# Patient Record
Sex: FEMALE | Race: WHITE | NOT HISPANIC OR LATINO | URBAN - METROPOLITAN AREA
[De-identification: names, ages, dates, MRNs, and addresses within clinical notes are randomized per-mention and may not be internally consistent; named-entity substitution may affect disease eponyms.]

---

## 2017-01-30 ENCOUNTER — OFFICE VISIT - RIVER FALLS (OUTPATIENT)
Dept: FAMILY MEDICINE | Facility: CLINIC | Age: 42
End: 2017-01-30

## 2017-01-30 ASSESSMENT — MIFFLIN-ST. JEOR: SCORE: 1329.67

## 2017-02-07 ENCOUNTER — OFFICE VISIT - RIVER FALLS (OUTPATIENT)
Dept: FAMILY MEDICINE | Facility: CLINIC | Age: 42
End: 2017-02-07

## 2017-02-07 ASSESSMENT — MIFFLIN-ST. JEOR: SCORE: 1323.32

## 2017-02-16 ENCOUNTER — OFFICE VISIT - RIVER FALLS (OUTPATIENT)
Dept: FAMILY MEDICINE | Facility: CLINIC | Age: 42
End: 2017-02-16

## 2017-02-16 ASSESSMENT — MIFFLIN-ST. JEOR: SCORE: 1332.39

## 2017-07-11 ENCOUNTER — AMBULATORY - RIVER FALLS (OUTPATIENT)
Dept: FAMILY MEDICINE | Facility: CLINIC | Age: 42
End: 2017-07-11

## 2017-11-17 ENCOUNTER — OFFICE VISIT - RIVER FALLS (OUTPATIENT)
Dept: FAMILY MEDICINE | Facility: CLINIC | Age: 42
End: 2017-11-17

## 2017-11-17 ASSESSMENT — MIFFLIN-ST. JEOR: SCORE: 1325.14

## 2022-02-12 VITALS
HEIGHT: 64 IN | TEMPERATURE: 97.4 F | HEART RATE: 56 BPM | RESPIRATION RATE: 16 BRPM | BODY MASS INDEX: 26.4 KG/M2 | WEIGHT: 156.6 LBS | TEMPERATURE: 97.9 F | HEIGHT: 64 IN | OXYGEN SATURATION: 98 % | SYSTOLIC BLOOD PRESSURE: 116 MMHG | DIASTOLIC BLOOD PRESSURE: 68 MMHG | BODY MASS INDEX: 26.63 KG/M2 | WEIGHT: 154.6 LBS | DIASTOLIC BLOOD PRESSURE: 76 MMHG | HEIGHT: 64 IN | WEIGHT: 156 LBS | HEART RATE: 60 BPM | TEMPERATURE: 99.1 F | SYSTOLIC BLOOD PRESSURE: 130 MMHG | BODY MASS INDEX: 26.73 KG/M2

## 2022-02-12 VITALS
DIASTOLIC BLOOD PRESSURE: 68 MMHG | TEMPERATURE: 98.2 F | HEIGHT: 64 IN | SYSTOLIC BLOOD PRESSURE: 100 MMHG | BODY MASS INDEX: 26.46 KG/M2 | HEART RATE: 64 BPM | WEIGHT: 155 LBS

## 2022-02-16 NOTE — PROGRESS NOTES
Patient:   OBDULIO CARTY            MRN: [id number]            FIN: [id number]               Age:   42 years     Sex:  Female     :  1975   Associated Diagnoses:   Routine screening for STI (sexually transmitted infection); Counseling for travel   Author:   Flor Daniels      Visit Information      Date of Service: 2017 10:20 am  Performing Location: [facility]  Encounter#: [id number]      Primary Care Provider (PCP):  97 -UNKNOWN,      Referring Provider:  Axel Azul MD# [id number]      Chief Complaint   2017 10:28 AM CST  Travel care [place]        History of Present Illness             The patient presents with need for travel counseling and immunizations.     SHe is going to [place], she did this last year, she is traveling to an area WITHOUT malaria and does not need prophylaxis  She and her group are bringing all their food and water so does not feel she needs typhoid vaccine, she didn't get it last year and no one in the group is getting it.  She would like cipro for diarrhea in case she gets travelers diarrhea  She is otherwise utd with vaccines    She is with her daughter today and when I ask Obdulio if she has other questions she shows e her phone where she has written me a message requesting STI testing as she and  are having marital problems. She does not want me to discuss in front of her daughter. She does deny 'symptoms'. Also confirms with me her phone contact and she is on the portal so she will make sure she can access that for test results/communications      Review of Systems   Constitutional:  Negative.    Eye:  Negative.    Ear/Nose/Mouth/Throat:  Negative.    Respiratory:  Negative.    Cardiovascular:  Negative.    Gastrointestinal:  Negative.    Genitourinary:  Negative.    Gynecologic:  Negative.    Hematology/Lymphatics:  Negative.    Endocrine:  Negative.    Immunologic:  Negative.    Musculoskeletal:  Negative.    Integumentary:   Negative.    Neurologic:  Negative.    Psychiatric:  Negative.          All other systems reviewed and negative      Health Status   Allergies:    Allergic Reactions (Selected)  No known allergies   Medications:  (Selected)   Prescriptions  Prescribed  Cipro 250 mg oral tablet: 1 tab(s) ( 250 mg ), PO, q12hr (int), Instructions: if needed for travelers diarrhea, # 6 tab(s), 0 Refill(s), Type: Maintenance, Pharmacy: [facility], 1 tab(s) po q12 hrs,x3 day(s),Instr:if needed for travelers diarrhea  Imitrex 50 mg oral tablet: 1 tab(s) ( 50 mg ), po, once, Instructions: may repeat dose once in 2 hours, # 9 tab(s), 0 Refill(s), Type: Soft Stop, Pharmacy: [facility], 1 tab(s) po once,Instr:may repeat dose once in 2 hours  Documented Medications  Documented  Mirena 52 mg intrauteral device: 1 EA ( 52 mg ), intrauteral, once, 0 Refill(s), Type: Maintenance   Problem list:    All Problems  Adult Macrosomia / ICD-9-.0 / Confirmed  Adult Macrosomia / ICD-9-.0 / Confirmed  Migraine / ICD-9-.90 / Confirmed  Chronic Constipation / ICD-9-.00 / Confirmed  Hypersomnia, idiopathic / ICD-9-.54 / Confirmed  Inactive: Gestational diabetes / ICD-9-.80  Resolved: Pregnancy / SNOMED CT 896921709  Resolved: Pregnancy / SNOMED CT 572766880  Resolved: Pregnancy / SNOMED CT 587293722  Resolved: Pregnancy / SNOMED CT 530294877  Canceled: Macrosomia      Histories   Past Medical History:    Active  Hypersomnia, idiopathic (780.54): Onset on 11/20/2011 at 36 years.  Chronic Constipation (564.00)  Migraine (346.90)  Adult Macrosomia (253.0)  Adult Macrosomia (253.0)  Resolved  Pregnancy (505943313):  Resolved on 9/12/2009 at 34 years.  Pregnancy (401764269):  Resolved on 1/11/2004 at 28 years.  Pregnancy (144247468):  Resolved on 12/18/2005 at 30 years.  Pregnancy (229614015):  Resolved on 10/24/2008 at 33 years.   Family History:    Cancer  Mother  Comments:  11/8/2010 1:00 PM - Chantelle Mays  of  "the throat.  Hypertension  Father  Hypothyroidism  Mother  Alcohol abuse  Father     Procedure history:    Laparoscopy with lysis of adhesions (SNOMED CT 21266839) performed by Sukh Santana DO on 2014 at 39 Years.  Insertion of IUD (SNOMED CT 148310543) performed by Sukh Santana DO on 2014 at 39 Years.  Comments:  2014 11:02 AM - Felicita Dowell  MSLT - Multiple sleep latency test (SNOMED CT 392761361) on 2011 at 36 Years.  Polysomnogram (SNOMED CT 380188985) on 2011 at 36 Years.  Colonoscopy (SNOMED CT 372446408) on 2011 at 35 Years.  Comments:  2011 10:47 AM - Alonso Hendrickson MA  Repeat colonoscopy at the age of 51   section (SNOMED CT 64050884) on 2009 at 34 Years.  D&C - Dilatation and curettage (SNOMED CT 2091379746) on 10/24/2008 at 33 Years.  Comments:  2013 9:42 AM - Saskia Wall  Missed AB  Cystourethrogram (SNOMED CT 780622377) on 2002 at 27 Years.  Comments:  2013 9:53 AM - Saskia Wall  Voiding  LEEP (SNOMED CT 54840641) in  at 23 Years.  Colposcopy (SNOMED CT 4772676675) in  at 23 Years.  Comments:  2013 9:56 AM - Saskia Wall  Normal  Cholecystectomy (SNOMED CT 74896306).  Comments:  2013 9:57 AM - Saskia Wall  Laparoscopic  Laparoscopy (SNOMED CT 641833415).  Extraction of wisdom tooth (SNOMED CT 060284751).   Social History:        Alcohol Assessment: Current            Current                     Comments:                      2017 - Magui King                     \"Not much\"      Tobacco Assessment: Denies Tobacco Use            Never smoker      Substance Abuse Assessment: Denies Substance Abuse            Never      Employment and Education Assessment            Employed, Work/School description: , [employer].      Home and Environment Assessment            Marital status: .  Spouse/Partner name: Ron Chaidez.  3 children.      Nutrition and Health Assessment       "      Type of diet: Regular.      Exercise and Physical Activity Assessment: Regular exercise            Exercise frequency: 6x/week Cardio and weights.      Sexual Assessment            Sexually active: Yes.  Contraceptive Use Details: Intrauterine device.      Other Assessment            Marital status                     Comments:                      11/08/2010 - Chantelle Mays                             Physical Examination   Vital Signs   11/17/2017 10:28 AM CST Temperature Tympanic 98.2 DegF    Peripheral Pulse Rate 64 bpm    Pulse Site Radial artery    HR Method Manual    Systolic Blood Pressure 100 mmHg    Diastolic Blood Pressure 68 mmHg    Mean Arterial Pressure 79 mmHg    BP Site Right arm    BP Method Manual      Measurements from flowsheet : Measurements   11/17/2017 10:28 AM CST Height Measured - Standard 63.5 in    Weight Measured - Standard 155.0 lb    BSA 1.77 m2    Body Mass Index 27.02 kg/m2      General:  Alert and oriented, No acute distress.    Integumentary:  Warm, Dry, Pink.    Psychiatric:  Cooperative, Appropriate mood & affect.       Impression and Plan   Diagnosis     Routine screening for STI (sexually transmitted infection) (HUO10-UP Z11.3).     Counseling for travel (JAO25-KV Z71.89).     Patient Instructions:       Counseled: Patient, Regarding diagnosis, Regarding treatment, Regarding medications, Verbalized understanding.    Orders     Orders (Selected)   Outpatient Orders  Ordered (Dispatched)  Chlamydia/Neisseria gonorrhoeae RNA, TMA* ([facility]): Specimen Type: Urine, Collection Date: 11/17/17 10:59:00 CST  HIV-1/2 Antigen and Antibodies, Fourth Generation, with Reflexes* ([facility]): Specimen Type: Serum, Collection Date: 11/17/17 10:59:00 CST  Hepatitis C Antibody* ([facility]): Specimen Type: Serum, Collection Date: 11/17/17 10:59:00 CST  RPR (dx) w/refl titer and confirmatory testing* ([facility]): Specimen Type: Serum, Collection Date: 11/17/17 10:59:00  CST  Prescriptions  Prescribed  Cipro 250 mg oral tablet: 1 tab(s) ( 250 mg ), PO, q12hr (int), Instructions: if needed for travelers diarrhea, # 6 tab(s), 0 Refill(s), Type: Maintenance, Pharmacy: [facility], 1 tab(s) po q12 hrs,x3 day(s),Instr:if needed for travelers diarrhea.     Check CDC.gov website for last minute travel notices and good advice regarding travel.  Spent 15 min in counseling regarding vaccinations, prophylaxisis with any needed mediacation,  prevention of diseases , use of antidiarrheall  medicines if warranted and travel safety.  -declines typhoid and flu vaccine  --malaria prophylaxis not needed as will be in city without Malaria  --She assures me she is NOT pregnancy and will not become pregnant, Zika is certainly in [place].

## 2022-02-16 NOTE — PROGRESS NOTES
"   Patient:   OBDULIO CARTY            MRN: [id number]            FIN: [id number]               Age:   41 years     Sex:  Female     :  1975   Associated Diagnoses:   Numbness   Author:   Parker Mann MD      Visit Information      Date of Service: 2017 09:19 am  Performing Location: [facility]  Encounter#: [id number]      Primary Care Provider (PCP):  RF97 -UNKNOWN,      Referring Provider:  No referring provider recorded for selected visit.      Chief Complaint   2017 9:49 AM CST    Pt here for numbness in elft side of face that she states she has had for \"2 years\" but has become worse x 2 days. Pt also c/o headaches x 2-3 days. Pt also having edema in both hands/fingers that started this morning      History of Present Illness   Has had possible trigeminal neuralgia over the past couple. Feels a split down midline of her face. Feels numb on the left side of her face increased in past couple days. Numbness mainly on the left but intermittently on the left. Gets pain intermittent on both sides of the face. Sometimes gets headaches frontal. Feels TMJ pain over the past couple of years. Has noticed over the years that left corner of mouth drooping more.      Review of Systems   Constitutional:  No fever.    Eye:  No visual disturbances.    Ear/Nose/Mouth/Throat:  No decreased hearing.    Respiratory:  No cough.    Gastrointestinal:  No vomiting.    Musculoskeletal:  No muscle weakness.    Left hand with some numbness yesterday      Health Status   Allergies:    Allergic Reactions (Selected)  No known allergies   Medications:  (Selected)   Prescriptions  Prescribed  Imitrex 50 mg oral tablet: 1 tab(s) ( 50 mg ), po, once, Instructions: may repeat dose once in 2 hours, # 9 tab(s), 0 Refill(s), Type: Soft Stop, Pharmacy: [facility], 1 tab(s) po once,Instr:may repeat dose once in 2 hours  Documented Medications  Documented  Mirena 52 mg intrauteral device: 1 EA ( 52 mg ), " intrauteral, once, 0 Refill(s), Type: Maintenance   Problem list:    All Problems  Adult Macrosomia / ICD-9-.0 / Confirmed  Adult Macrosomia / ICD-9-.0 / Confirmed  Chronic Constipation / ICD-9-.00 / Confirmed  Hypersomnia, idiopathic / ICD-9-.54 / Confirmed  Migraine / ICD-9-.90 / Confirmed  Inactive: Gestational diabetes / ICD-9-.80  Resolved: Pregnancy / SNOMED CT 158688178  Resolved: Pregnancy / SNOMED CT 987784512  Resolved: Pregnancy / SNOMED CT 286562027  Resolved: Pregnancy / SNOMED CT 807740869  Canceled: Macrosomia      Histories   Family History: No aneurysms   Procedure history:    Laparoscopy with lysis of adhesions (SNOMED CT 76642852) performed by Sukh Santana DO on 2014 at 39 Years.  Insertion of IUD (SNOMED CT 567387492) performed by Sukh Santana DO on 2014 at 39 Years.  Comments:  2014 11:02 AM - Felicita Dowell  MSLT - Multiple sleep latency test (SNOMED CT 891922642) on 2011 at 36 Years.  Polysomnogram (SNOMED CT 967200236) on 2011 at 36 Years.  Colonoscopy (SNOMED CT 121041218) on 2011 at 35 Years.  Comments:  2011 10:47 AM - Alonso Hendrickson MA  Repeat colonoscopy at the age of 51   section (SNOMED CT 47007042) on 2009 at 34 Years.  D&C - Dilatation and curettage (SNOMED CT 2224360423) on 10/24/2008 at 33 Years.  Comments:  2013 9:42 AM - Saskia Wall  Missed AB  Cystourethrogram (SNOMED CT 551250337) on 2002 at 27 Years.  Comments:  2013 9:53 AM - Saskia Wall  Voiding  LEEP (SNOMED CT 15076754) in  at 23 Years.  Colposcopy (SNOMED CT 8785013866) in  at 23 Years.  Comments:  2013 9:56 Saskia Tolentino  Normal  Cholecystectomy (SNOMED CT 22483747).  Comments:  2013 9:57 Saskia Tolentino  Laparoscopic  Laparoscopy (SNOMED CT 595218136).  Extraction of wisdom tooth (SNOMED CT 691066942).   Social History:  (Ron). . Nonsmoker      Physical  Examination   Vital Signs   1/30/2017 9:49 AM CST Temperature Tympanic 97.9 DegF    Peripheral Pulse Rate 60 bpm    Systolic Blood Pressure 130 mmHg    Diastolic Blood Pressure 76 mmHg    Oxygen Saturation 98 %      General:  Alert and oriented, No acute distress.    Eye:  Normal conjunctiva.    HENT:  No pharyngeal erythema.    Neck:  No lymphadenopathy.    Respiratory:  Lungs are clear to auscultation.    Cardiovascular:  Normal rate, Regular rhythm.    Gastrointestinal:  Soft, Non-tender, Non-distended.    Musculoskeletal:  Normal gait.    Neurologic:  Alert, Oriented, decreased sensation on the left face.    Psychiatric:  Appropriate mood & affect.       Impression and Plan   Diagnosis     Numbness (GRF59-QL R20.0).     Doubt stroke. Possible TMJ and trigeminal neuralgia. Start naprosyn. MRI/MRA head and neck. Follow up in 1-2 weeks. Warm packs and soft foods    Addendum 2/6: MRI/MRA with no evidence of stroke or aneursym

## 2022-02-16 NOTE — PROGRESS NOTES
Patient:   OBDULIO CARTY            MRN: [id number]            FIN: [id number]               Age:   41 years     Sex:  Female     :  1975   Associated Diagnoses:   Acute sinusitis   Author:   Basilio Wharton MD      Impression and Plan   Diagnosis     Acute sinusitis (LPE57-AM J01.41).     Course:  Worsening.    Orders     Orders   Charges (Evaluation and Management):  02443 office outpatient visit 15 minutes (Charge) (Order): Quantity: 1, Acute sinusitis.     Orders (Selected)   Prescriptions  Prescribed  azithromycin 250 mg oral tablet: 1 tab(s) ( 250 mg ), PO, Daily, # 7 tab(s), 0 Refill(s), Type: Maintenance, Pharmacy: [facility], 1 tab(s) po daily,x7 day(s).        Visit Information      Date of Service: 2017 01:45 pm  Performing Location: [facility]  Encounter#: [id number]      Primary Care Provider (PCP):  VINNY -UNKNOWN,   Visit type:  New symptom.    Accompanied by:  No one.    Source of history:  Self.    History limitation:  None.       Chief Complaint   Chief complaint discussed and confirmed correct.     2017 1:46 PM CST    Pt here for body aches        History of Present Illness             The patient presents with sinus problem.  The sinus problem is located in the frontal sinus, ethmoid sinus and maxillary sinus.  The sinus problem is characterized by nasal congestion, headache and facial pain.  The severity of the sinus problem is severe.  The sinus problem is constant.  The sinus problem has lasted for 4 day(s).  The context of the sinus problem: occurred in association with illness.  Associated symptoms consist of fever, cough and sore throat.        Review of Systems   Constitutional:  Negative.    Eye:  Negative.    Ear/Nose/Mouth/Throat:  Nasal congestion, Sinus pain.    Respiratory:  Negative.    Cardiovascular:  Negative.    Gastrointestinal:  Negative.    Genitourinary:  Negative.    Hematology/Lymphatics:  Negative.    Endocrine:  Negative.     Immunologic:  Negative.    Musculoskeletal:  Negative.    Integumentary:  Negative.    Neurologic:  Negative.    Psychiatric:  Negative.          All other systems reviewed and negative      Health Status   Allergies:    Allergic Reactions (Selected)  No known allergies   Medications:  (Selected)   Prescriptions  Prescribed  Imitrex 50 mg oral tablet: 1 tab(s) ( 50 mg ), po, once, Instructions: may repeat dose once in 2 hours, # 9 tab(s), 0 Refill(s), Type: Soft Stop, Pharmacy: [facility], 1 tab(s) po once,Instr:may repeat dose once in 2 hours  Naprosyn 500 mg oral tablet: 1 tab(s) ( 500 mg ), PO, BID, # 28 tab(s), 0 Refill(s), Type: Maintenance, Pharmacy: [facility], 1 tab(s) po bid,x14 day(s)  azithromycin 250 mg oral tablet: 1 tab(s) ( 250 mg ), PO, Daily, # 7 tab(s), 0 Refill(s), Type: Maintenance, Pharmacy: [facility], 1 tab(s) po daily,x7 day(s)  Documented Medications  Documented  Mirena 52 mg intrauteral device: 1 EA ( 52 mg ), intrauteral, once, 0 Refill(s), Type: Maintenance   Problem list:    All Problems  Adult Macrosomia / ICD-9-.0 / Confirmed  Adult Macrosomia / ICD-9-.0 / Confirmed  Chronic Constipation / ICD-9-.00 / Confirmed  Hypersomnia, idiopathic / ICD-9-.54 / Confirmed  Migraine / ICD-9-.90 / Confirmed  Inactive: Gestational diabetes / ICD-9-.80  Resolved: Pregnancy / SNOMED CT 033305266  Resolved: Pregnancy / SNOMED CT 664279783  Resolved: Pregnancy / SNOMED CT 894919599  Resolved: Pregnancy / SNOMED CT 510057705  Canceled: Macrosomia      Histories   Past Medical History:    Active  Hypersomnia, idiopathic (780.54): Onset on 11/20/2011 at 36 years.  Chronic Constipation (564.00)  Migraine (346.90)  Adult Macrosomia (253.0)  Adult Macrosomia (253.0)  Resolved  Pregnancy (746268759):  Resolved on 9/12/2009 at 34 years.  Pregnancy (400813878):  Resolved on 1/11/2004 at 28 years.  Pregnancy (121640440):  Resolved on 12/18/2005 at 30  "years.  Pregnancy (338674263):  Resolved on 10/24/2008 at 33 years.   Family History:    Cancer  Mother  Comments:  2010 1:00 PM - Chantelle Mays  of the throat.  Hypertension  Father  Hypothyroidism  Mother  Alcohol abuse  Father     Procedure history:    Laparoscopy with lysis of adhesions (SNOMED CT 18028175) performed by Sukh Santana DO on 2014 at 39 Years.  Insertion of IUD (SNOMED CT 373634834) performed by Sukh Santana DO on 2014 at 39 Years.  Comments:  2014 11:02 AM - Felicita Dowell  MSLT - Multiple sleep latency test (SNOMED CT 943080343) on 2011 at 36 Years.  Polysomnogram (SNOMED CT 425887338) on 2011 at 36 Years.  Colonoscopy (SNOMED CT 226496596) on 2011 at 35 Years.  Comments:  2011 10:47 AM - Alonso Hendrickson MA  Repeat colonoscopy at the age of 51   section (SNOMED CT 79804540) on 2009 at 34 Years.  D&C - Dilatation and curettage (SNOMED CT 4246470264) on 10/24/2008 at 33 Years.  Comments:  2013 9:42 AM - Saskia Wall  Missed AB  Cystourethrogram (SNOMED CT 277541624) on 2002 at 27 Years.  Comments:  2013 9:53 AM - Saskia Wall  Voiding  LEEP (SNOMED CT 65620217) in  at 23 Years.  Colposcopy (SNOMED CT 8613635564) in  at 23 Years.  Comments:  2013 9:56 AM - Saskia Wall  Normal  Cholecystectomy (SNOMED CT 32727991).  Comments:  2013 9:57 AM - Saskia Wall  Laparoscopic  Laparoscopy (SNOMED CT 066306465).  Extraction of wisdom tooth (SNOMED CT 918499063).   Social History:        Alcohol Assessment: Current            Current                     Comments:                      2017 - Magui King                     \"Not much\"      Tobacco Assessment: Denies Tobacco Use            Never smoker      Substance Abuse Assessment: Denies Substance Abuse            Never      Employment and Education Assessment            Employed, Work/School description: , [employer].      Home " and Environment Assessment            Marital status: .  Spouse/Partner name: Ron Chaidez.  3 children.      Nutrition and Health Assessment            Type of diet: Regular.      Exercise and Physical Activity Assessment: Regular exercise            Exercise frequency: 6x/week Cardio and weights.      Sexual Assessment            Sexually active: Yes.  Contraceptive Use Details: Intrauterine device.      Other Assessment            Marital status                     Comments:                      11/08/2010 - Chantelle Mays                             Physical Examination   Vital signs reviewed  and within acceptable limits    Vital Signs   2/16/2017 1:46 PM CST    Temperature Tympanic      99.1 DegF     Measurements from flowsheet : Measurements   2/16/2017 1:46 PM CST Height Measured - Standard 63.5 in    Weight Measured - Standard 156.6 lb    BSA 1.78 m2    Body Mass Index 27.3 kg/m2      General:  Alert and oriented, No acute distress.    Eye:  Pupils are equal, round and reactive to light, Extraocular movements are intact, Normal conjunctiva.    HENT:  Normocephalic, Tympanic membranes are clear, Normal hearing, Oral mucosa is moist, No pharyngeal erythema.         Sinus: Bilateral, Ethmoid sinus, Frontal sinus, Maxillary sinus, Tenderness.    Neck:  Supple, Non-tender, No lymphadenopathy.    Respiratory:  Lungs are clear to auscultation, Respirations are non-labored, Breath sounds are equal.    Cardiovascular:  Normal rate, Regular rhythm, No murmur, Normal peripheral perfusion, No edema.    Integumentary:  Warm, Dry, Pink.    Psychiatric:  Cooperative, Appropriate mood & affect, Normal judgment.

## 2022-02-16 NOTE — PROGRESS NOTES
Patient:   OBDULIO CARTY            MRN: [id number]            FIN: [id number]               Age:   41 years     Sex:  Female     :  1975   Associated Diagnoses:   Left facial numbness; Trigeminal neuralgia   Author:   Parker Cortez MD      Visit Information      Date of Service: 2017 12:10 pm  Performing Location: [facility]  Encounter#: [id number]      Primary Care Provider (PCP):  [id number] -UNKNOWN,      Chief Complaint   2017 1:04 PM CST     here to discuss lab and MRI results.  all were done last week.  still having left sided facial numbness as well as left shoulder/back.      History of Present Illness   Patient is here to discuss lab and MRI results from 17 and 17, respectively.  She was seen by Dr. Mann 17 for left sided facial numbness.  She is still having numbness to her face and now has noticed numbness to her left side of her upper back and shoulder.  She does state that she has had numbness for several years and is getting worse.  She was diagnosed with trigeminal neuralgia.  She was taking Carbamazepine in the past but only took it as needed.  She denies daily headaches, no hearing issues.  She is up to date on her eye exams.      Review of Systems   Constitutional:  No fever, No chills.    Eye:  dizziness at times..    Ear/Nose/Mouth/Throat:  Negative.    Gastrointestinal:  No nausea, No vomiting.    Musculoskeletal:  numbness to left shoulder.    Neurologic:  Alert and oriented X4, Numbness, left side of face.             Health Status   Allergies:    Allergic Reactions (Selected)  No known allergies   Medications:  (Selected)   Prescriptions  Prescribed  Imitrex 50 mg oral tablet: 1 tab(s) ( 50 mg ), po, once, Instructions: may repeat dose once in 2 hours, # 9 tab(s), 0 Refill(s), Type: Soft Stop, Pharmacy: [facility], 1 tab(s) po once,Instr:may repeat dose once in 2 hours  Naprosyn 500 mg oral tablet: 1 tab(s) ( 500 mg ), PO, BID, # 28  tab(s), 0 Refill(s), Type: Maintenance, Pharmacy: [facility], 1 tab(s) po bid,x14 day(s)  Documented Medications  Documented  Mirena 52 mg intrauteral device: 1 EA ( 52 mg ), intrauteral, once, 0 Refill(s), Type: Maintenance   Problem list:    All Problems (Selected)  Migraine / ICD-9-.90 / Confirmed  Hypersomnia, idiopathic / ICD-9-.54 / Confirmed  Chronic Constipation / ICD-9-.00 / Confirmed  Adult Macrosomia / ICD-9-.0 / Confirmed  Adult Macrosomia / ICD-9-.0 / Confirmed      Histories   Past Medical History:    Active  Hypersomnia, idiopathic (780.54): Onset on 2011 at 36 years.  Chronic Constipation (564.00)  Migraine (346.90)  Adult Macrosomia (253.0)  Adult Macrosomia (253.0)  Resolved  Pregnancy (927932850):  Resolved on 2009 at 34 years.  Pregnancy (167540788):  Resolved on 2004 at 28 years.  Pregnancy (636460407):  Resolved on 2005 at 30 years.  Pregnancy (876199951):  Resolved on 10/24/2008 at 33 years.   Family History:    Cancer  Mother  Comments:  2010 1:00 PM - Chantelle Mays  of the throat.  Hypertension  Father  Hypothyroidism  Mother  Alcohol abuse  Father     Procedure history:    Laparoscopy with lysis of adhesions (89600145) on 2014 at 39 Years.  Insertion of IUD (344172095) on 2014 at 39 Years.  Comments:  2014 11:02 AM - Felicita Dowell  MSLT - Multiple sleep latency test (236414707) on 2011 at 36 Years.  Polysomnogram (960524628) on 2011 at 36 Years.  Colonoscopy (582651573) on 2011 at 35 Years.  Comments:  2011 10:47 AM - Alonso Hendrickson MA  Repeat colonoscopy at the age of 51   section (81457653) on 2009 at 34 Years.  D&C - Dilatation and curettage (4210156664) on 10/24/2008 at 33 Years.  Comments:  2013 9:42 AM - Saskia Wall  Missed AB  Cystourethrogram (791511567) on 2002 at 27 Years.  Comments:  2013 9:53 AM - Saskia Wall  Voiding  LEEP (80229803) in   at 23 Years.  Colposcopy (9710547759) in 1998 at 23 Years.  Comments:  4/4/2013 9:56 AM - Saskia Wall  Normal  Cholecystectomy (31994353).  Comments:  4/4/2013 9:57 AM - Saskia Wall  Laparoscopic  Laparoscopy (652258708).  Extraction of wisdom tooth (752635456).   Social History:        Alcohol Assessment            Current, 1-2 times per year                     Comments:                      11/08/2010 - Davon Chantelle                     Rarely      Tobacco Assessment            Never      Substance Abuse Assessment            Never      Employment and Education Assessment            Employed, Work/School description: , [employer].      Home and Environment Assessment            Marital status: .  3 children.      Nutrition and Health Assessment            Type of diet: Regular.      Exercise and Physical Activity Assessment            Exercise frequency: 5-6 times/week.  Exercise type: [place] pump,[employer] workouts,jogging..      Sexual Assessment            Sexually active: Yes.  Contraceptive Use Details: Intrauterine device.      Other Assessment            Marital status                     Comments:                      11/08/2010 - Davon Chantelle                             Physical Examination   Vital Signs   2/7/2017 1:04 PM CST Temperature Tympanic 97.4 DegF  LOW    Peripheral Pulse Rate 56 bpm  LOW    Pulse Site Radial artery    HR Method Manual    Systolic Blood Pressure 116 mmHg    Diastolic Blood Pressure 68 mmHg    Mean Arterial Pressure 84 mmHg    BP Site Left arm    BP Method Manual      Measurements from flowsheet : Measurements   2/7/2017 1:04 PM CST Height Measured - Standard 63.5 in    Weight Measured - Standard 154.6 lb    BSA 1.77 m2    Body Mass Index 26.95 kg/m2      General:  Alert and oriented.    Eye:  Normal conjunctiva.    Integumentary:  Warm, Pink, Intact.    Neurologic:  Alert, Oriented, Normal motor function, Cranial Nerves  II-XII are grossly intact, Normal reflexes, Normal deep tendon reflexes, decreased sensation to light touch and pin prick on the left face, neck and upper chest.    Psychiatric:  Cooperative.       Review / Management   Results review:  Lab results   1/30/2017 10:40 AM CST Sodium Level 137 mmol/L    Potassium Level 4.0 mmol/L    Chloride Level 102 mmol/L    CO2 Level 30 mmol/L    Glucose Level 97 mg/dL    BUN 16 mg/dL    Creatinine Level 0.83 mg/dL    BUN/Creat Ratio NOT APPLICABLE    eGFR 88 mL/min/1.73m2    eGFR African American 102 mL/min/1.73m2    Calcium Level 9.8 mg/dL    WBC 6.6    RBC 4.38    Hgb 13.9 gm/dL    Hct 40.6 %    MCV 92.7 fL    MCH 31.7 pg    MCHC 34.2 gm/dL    RDW 13.0 %    Platelet 249    MPV 7.8 fL    Sed Rate 2    EILEEN IFA NEGATIVE   .         Interpretation: Normal results.    Radiology results   Magnetic Resonance Imaging, Interpretation:  showed no evidence of stroke or aneurysm      Impression and Plan   Diagnosis     Left facial numbness (OXR82-UB R20.0).     Trigeminal neuralgia (UFT37-XT G50.0).     Course:  Worsening.    Plan:  Doubt symptoms are from trigeminal neuralgia since no significant pain.  It is recommended that she see a neurologist rather than restart her on Carbamazepine to make sure what other alternatives she can try for treatment.  A referral for a consult will be made..    Patient Instructions:       Counseled: Patient, Regarding diagnosis, Regarding treatment, Verbalized understanding.    Orders     Orders (Selected)   Outpatient Orders  Completed  Referral (Request): 02/07/17 13:38:00 CST, Referred to: Neurology, Reason for referral: worsening trigeminal neuralgia, Left facial numbness  Trigeminal neuralgia.     Saskia DELGADO MA, acted solely as a scribe for, and in the presence of Dr. Parker Cortez who performed the services.    Parker DELGADO MD, personally performed the services described in this documentation.  The documentation was scribed in my  presence and is both accurate and complete.

## 2025-05-16 ENCOUNTER — TRANSFERRED RECORDS (OUTPATIENT)
Dept: HEALTH INFORMATION MANAGEMENT | Facility: CLINIC | Age: 50
End: 2025-05-16

## 2025-05-21 ENCOUNTER — MEDICAL CORRESPONDENCE (OUTPATIENT)
Dept: HEALTH INFORMATION MANAGEMENT | Facility: CLINIC | Age: 50
End: 2025-05-21

## 2025-06-08 ENCOUNTER — HEALTH MAINTENANCE LETTER (OUTPATIENT)
Age: 50
End: 2025-06-08

## 2025-06-12 NOTE — CONFIDENTIAL NOTE
NEUROLOGY - PRE VISIT PLANNING             Referring Provider Reason for Referral Office Visit Notes   Yoly Mckoy MD   [facility]  Headache  Generalized idiopathic epilepsy and epileptic syndromes, not intractable, without status epilepticus (H)   Other long term (current) drug therapy  5/16/2025 - Scanned into Media        IMAGING/NOTES/SCANS Status/ Location  DATE   MRI/MRA(HEAD, NECK, SPINE) [facility]  -REQUESTED  [facility] -REQUESTED 2/09/2024, 10/10/2018   11/30/2022   CT/CTA   [facility] - REQUESTED 8/30/2021    EMG N/A    EEG [facility] - Media 10/26/2021   LABS External    Neuropsychological testing  N/A    Additional Testing/Notes N/A      Does patient have C2C?  Year last updated Action     YES   []      Please update at appointment if outdated more than 5 years       NO     [x]   N/A   Please complete C2C at appointment

## 2025-08-20 ENCOUNTER — PRE VISIT (OUTPATIENT)
Dept: NEUROLOGY | Facility: CLINIC | Age: 50
End: 2025-08-20